# Patient Record
Sex: FEMALE | Race: WHITE | NOT HISPANIC OR LATINO | Employment: UNEMPLOYED | URBAN - METROPOLITAN AREA
[De-identification: names, ages, dates, MRNs, and addresses within clinical notes are randomized per-mention and may not be internally consistent; named-entity substitution may affect disease eponyms.]

---

## 2022-10-14 DIAGNOSIS — Z12.31 BREAST CANCER SCREENING BY MAMMOGRAM: Primary | ICD-10-CM

## 2022-10-24 ENCOUNTER — OFFICE VISIT (OUTPATIENT)
Dept: OBSTETRICS AND GYNECOLOGY | Facility: CLINIC | Age: 42
End: 2022-10-24

## 2022-10-24 VITALS
BODY MASS INDEX: 25.8 KG/M2 | WEIGHT: 174.19 LBS | HEIGHT: 69 IN | DIASTOLIC BLOOD PRESSURE: 60 MMHG | SYSTOLIC BLOOD PRESSURE: 112 MMHG

## 2022-10-24 DIAGNOSIS — N93.9 ABNORMAL UTERINE BLEEDING: Primary | ICD-10-CM

## 2022-10-24 DIAGNOSIS — Z12.31 BREAST CANCER SCREENING BY MAMMOGRAM: ICD-10-CM

## 2022-10-24 DIAGNOSIS — F32.89 OTHER DEPRESSION: ICD-10-CM

## 2022-10-24 PROCEDURE — 99202 OFFICE O/P NEW SF 15 MIN: CPT | Mod: S$GLB,,, | Performed by: OBSTETRICS & GYNECOLOGY

## 2022-10-24 PROCEDURE — 99202 PR OFFICE/OUTPT VISIT, NEW, LEVL II, 15-29 MIN: ICD-10-PCS | Mod: S$GLB,,, | Performed by: OBSTETRICS & GYNECOLOGY

## 2022-10-24 RX ORDER — FLUOXETINE HYDROCHLORIDE 20 MG/1
20 CAPSULE ORAL DAILY
Qty: 30 CAPSULE | Refills: 2 | Status: SHIPPED | OUTPATIENT
Start: 2022-10-24 | End: 2023-10-24

## 2022-10-24 NOTE — PROGRESS NOTES
Patient ID: Allie Haas is a 42 y.o. female.    Chief Complaint:  Fibroids (Pt is new to the clinic. Pt would like to discuss hx of fibroids. Pt experiences heavy monthly  cycles with clots, pain, and fatigue.)      History of Present Illness  HPI  Patient presents today with complaints of worsening heavy irregular cycles.  She states 7 years ago she was told she had fibroids when she had a workup done in [place].  She states that over the last several years her cycles have become increasingly heavy and painful to the point where she passes very large clots.  Most of them last 5-7 days however she will have months where she has a cycle every 2 weeks.  Is causing significant fatigue.  She also reports depression symptoms.  No suicidal thoughts but states that her depression seems to be worsening over the years as well.  She has done well with coping in the past however is getting harder and harder.  She is so CA eight's worsening depression with difficulty concentrating and fogginess at times.  She is overdue for her mammogram and Pap smear as well.  Her last Pap smear was 6 or 7 years ago with her last pregnancy.  She is currently not on any medications but does eat healthfully and takes regular supplements.  She has no other medical problems is doing well otherwise.    GYN & OB History  No LMP recorded (lmp unknown).   Date of Last Pap: No result found    History reviewed. No pertinent past medical history.  Past Surgical History:   Procedure Laterality Date     SECTION      WISDOM TOOTH EXTRACTION       Review of patient's allergies indicates:  No Known Allergies  No current outpatient medications on file prior to visit.     No current facility-administered medications on file prior to visit.       OB History    Para Term  AB Living   6         6   SAB IAB Ectopic Multiple Live Births                  # Outcome Date GA Lbr Jose/2nd Weight Sex Delivery Anes PTL Lv   6             5  "            4             3             2             1                 Review of Systems  Review of Systems   Constitutional:  Positive for fatigue. Negative for fever and unexpected weight change.   HENT:  Negative for nasal congestion.    Respiratory:  Negative for cough and shortness of breath.    Cardiovascular:  Negative for chest pain, palpitations and leg swelling.   Gastrointestinal:  Negative for abdominal pain, constipation, diarrhea, nausea, vomiting and reflux.   Endocrine: Negative for diabetes, hair loss and hot flashes.   Genitourinary:  Positive for dysmenorrhea, menorrhagia and menstrual problem. Negative for bladder incontinence, decreased libido, dyspareunia, dysuria, hot flashes, pelvic pain, vaginal discharge and vaginal dryness.   Musculoskeletal:  Negative for arthralgias, back pain and myalgias.   Integumentary:  Negative for rash, acne, hair changes, mole/lesion, breast mass, nipple discharge, breast skin changes and breast tenderness.   Neurological:  Negative for seizures, syncope and headaches.   Hematological:  Negative for adenopathy. Does not bruise/bleed easily.   Psychiatric/Behavioral:  Positive for depression. Negative for sleep disturbance. The patient is nervous/anxious.    Breast: Negative for breast self exam, lump, mass, mastodynia, nipple discharge, skin changes and tenderness            Objective:   /60   Ht 5' 9" (1.753 m)   Wt 79 kg (174 lb 3.2 oz)   LMP  (LMP Unknown) Comment: 2 wks ago  BMI 25.72 kg/m²        Physical Exam:   Constitutional: She is oriented to person, place, and time. She appears well-developed and well-nourished. No distress.    HENT:   Head: Normocephalic and atraumatic.   Nose: Nose normal.            Abdominal: Soft. She exhibits no distension and no mass. There is no abdominal tenderness.             Musculoskeletal: Normal range of motion and moves all extremeties. No edema.       Neurological: She is " alert and oriented to person, place, and time. No cranial nerve deficit.     Psychiatric: She has a normal mood and affect. Her behavior is normal. Judgment and thought content normal.          Assessment:      1. Abnormal uterine bleeding    2. Breast cancer screening by mammogram    3. Other depression          Plan:      Orders Placed This Encounter   Procedures    Mammo Digital Screening Bilat w/ Billy     Standing Status:   Future     Number of Occurrences:   1     Standing Expiration Date:   10/24/2024     Order Specific Question:   Has patient had radiation?     Answer:   No     Order Specific Question:   Has the patient had chemotherapy?     Answer:   No     Order Specific Question:   May the Radiologist modify the order per protocol to meet the clinical needs of the patient?     Answer:   Yes     Order Specific Question:   Does the patient have breast implants?     Answer:   No     Order Specific Question:   Release to patient     Answer:   Immediate    US Pelvis Comp with Transvag NON-OB (xpd     Standing Status:   Future     Standing Expiration Date:   10/24/2023     Order Specific Question:   May the Radiologist modify the order per protocol to meet the clinical needs of the patient?     Answer:   Yes     Order Specific Question:   Release to patient     Answer:   Immediate       Symptoms of abnormal uterine bleeding discussed at length.  Recommend workup the ultrasound evaluation.  Discussed possible need for endometrial biopsy as well.  Discussed treatment options would be pending ultrasound results.  Discussed pathophysiology of uterine leiomyoma and if she was diagnosed 7 years ago with uterine leiomyoma that day of probably continued to grow.  She expressed understanding.  Discussed depression symptoms as well as premenstrual dysphoric disorder.  Patient would like to attempt medical management as well as counseling through a local counselor.  Information was given.  Will start Prozac 20 mg daily.   Discussed side effects and expectations and she expressed understanding.  Order for mammogram written.  Declines Pap smear at this time.  Return in 1 month to discuss affects.

## 2022-10-28 PROBLEM — R41.3 MEMORY CHANGES: Status: ACTIVE | Noted: 2022-10-28

## 2022-10-28 PROBLEM — E61.1 IRON DEFICIENCY: Status: ACTIVE | Noted: 2022-10-28

## 2022-10-28 PROBLEM — F90.0 ATTENTION DEFICIT HYPERACTIVITY DISORDER (ADHD), PREDOMINANTLY INATTENTIVE TYPE: Status: ACTIVE | Noted: 2022-10-28

## 2022-10-28 PROBLEM — R45.84 ANHEDONIA: Status: ACTIVE | Noted: 2022-10-28

## 2022-11-14 ENCOUNTER — TELEPHONE (OUTPATIENT)
Dept: OBSTETRICS AND GYNECOLOGY | Facility: CLINIC | Age: 42
End: 2022-11-14

## 2022-11-14 NOTE — TELEPHONE ENCOUNTER
----- Message from Viridiana Ahuja sent at 11/11/2022 11:25 AM CST -----  Contact: pt  Pt is requesting orders to be faxed    Orders Requested: needing more imaging. From her mammo, got a letter from [facility]    Reason for the orders: something seen in mammo   Additional Information:     Please call pt to inform them the orders have been faxed so that they are able to call and schedule.     Call Back #: the patient [phone]    Fax #:    Name of Company being Faxed: [facility]  Thanks

## 2022-11-16 DIAGNOSIS — Z12.31 BREAST CANCER SCREENING BY MAMMOGRAM: Primary | ICD-10-CM

## 2022-11-18 ENCOUNTER — TELEPHONE (OUTPATIENT)
Dept: OBSTETRICS AND GYNECOLOGY | Facility: CLINIC | Age: 42
End: 2022-11-18